# Patient Record
Sex: MALE | Race: WHITE
[De-identification: names, ages, dates, MRNs, and addresses within clinical notes are randomized per-mention and may not be internally consistent; named-entity substitution may affect disease eponyms.]

---
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
## 2019-02-15 NOTE — ASU PREOP CHECKLIST - VERIFY SURGICAL SITE/SIDE WITH PATIENT
Airway patent, Nasal mucosa clear. Mouth with normal mucosa. Throat has no vesicles, mild erythema with  oropharyngeal exudates and uvula is midline.
done

## 2020-06-02 ENCOUNTER — EMERGENCY (EMERGENCY)
Facility: HOSPITAL | Age: 20
LOS: 1 days | Discharge: HOME | End: 2020-06-02
Attending: EMERGENCY MEDICINE
Payer: COMMERCIAL

## 2020-06-02 VITALS
HEIGHT: 74 IN | OXYGEN SATURATION: 100 % | DIASTOLIC BLOOD PRESSURE: 76 MMHG | WEIGHT: 184.97 LBS | SYSTOLIC BLOOD PRESSURE: 137 MMHG | RESPIRATION RATE: 19 BRPM | HEART RATE: 68 BPM | TEMPERATURE: 98 F

## 2020-06-02 VITALS
OXYGEN SATURATION: 99 % | TEMPERATURE: 99 F | DIASTOLIC BLOOD PRESSURE: 81 MMHG | SYSTOLIC BLOOD PRESSURE: 133 MMHG | HEART RATE: 69 BPM | RESPIRATION RATE: 18 BRPM

## 2020-06-02 PROCEDURE — 12001 RPR S/N/AX/GEN/TRNK 2.5CM/<: CPT | Mod: F1

## 2020-06-02 PROCEDURE — 99283 EMERGENCY DEPT VISIT LOW MDM: CPT | Mod: 25

## 2020-06-02 PROCEDURE — 73130 X-RAY EXAM OF HAND: CPT | Mod: 26,LT

## 2020-06-02 PROCEDURE — 12001 RPR S/N/AX/GEN/TRNK 2.5CM/<: CPT

## 2020-06-02 PROCEDURE — 73130 X-RAY EXAM OF HAND: CPT

## 2020-06-02 RX ORDER — IBUPROFEN 200 MG
600 TABLET ORAL ONCE
Refills: 0 | Status: COMPLETED | OUTPATIENT
Start: 2020-06-02 | End: 2020-06-02

## 2020-06-02 RX ADMIN — Medication 600 MILLIGRAM(S): at 13:06

## 2020-06-02 NOTE — ED PROVIDER NOTE - PATIENT PORTAL LINK FT
You can access the FollowMyHealth Patient Portal offered by [facility] by registering at the following website: [email or web address]. By joining [employer]’s FollowMyHealth portal, you will also be able to view your health information using other applications (apps) compatible with our system.

## 2020-06-02 NOTE — ED PROVIDER NOTE - CARE PROVIDER_API CALL
Alea Garay  PLASTIC SURGERY  [address] [address]  Phone: [phone]  Fax: [phone]  Follow Up Time: Urgent

## 2020-06-02 NOTE — ED PROVIDER NOTE - OBJECTIVE STATEMENT
18 y/o M w/ no sig PMH presenting w/ L finger injury. [place] room 2. Shortly prior to arrival pt was using chainsaw during yard work. Put the chainsaw down and then inadvertently touch the saw w/ his L hand. Sustained laceration to L index finger. Wound irrigated and pressure wrapped by EMS. Pt complaining of pain to the site. Denies any numbness or tingling. Denies fevers, chills, headache, dizziness, blurred vision, chest pain, cough, shortness of breath, abdominal pain, n/v/d/c, urinary symptoms, rash.

## 2020-06-02 NOTE — ED PROVIDER NOTE - ATTENDING CONTRIBUTION TO CARE
------------ATTENDING NOTE------------  healthy vaccinated RHD pt c/o trauma to L 2nd (index finger) from chainsaw garden tool, no additional injuries, laceration adjacent to L 2nd PIP, has full extension/flexion, 2 pt nvi w/ bcr distally, no fb, plain radiographs w/o fx (my read), easily repaired, in depth dw pt about ddx, tx, antunez, continued close outpt fu.  - Rell Baron MD   ----------------------------------------------

## 2020-06-02 NOTE — ED ADULT NURSE NOTE - CHIEF COMPLAINT
The patient is a 19y Male complaining of The patient is a 19y Male complaining of left index finger laceration

## 2020-06-02 NOTE — ED PROVIDER NOTE - NSFOLLOWUPINSTRUCTIONS_ED_ALL_ED_FT
See Hand Specialist (Dr Bermeo) next week for follow up -- call to discuss.    Acetaminophen and/or Ibuprofen as directed for pain -- see medication warnings.    Keep wound clean/dry, wash gently soap/water, use finger splint as directed.    See FINGER LACERATION information and return instructions given to you.    Seek immediate medical care for new/worsening symptoms/concerns.

## 2020-06-02 NOTE — ED ADULT NURSE NOTE - CHPI ED NUR SYMPTOMS NEG
no blood in mucus/no purulent drainage/no redness/no vomiting/no chills/no drainage/no rectal pain/no fever

## 2020-06-02 NOTE — ED PROVIDER NOTE - PHYSICAL EXAMINATION
Gen: NAD, AOx3, able to make needs known, non-toxic  Head: NCAT  HEENT: EOMI, oral mucosa moist, normal conjunctiva  Lung: CTAB, no respiratory distress, no wheezes/rhonchi/rales B/L, speaking in full sentences  CV: RRR, no murmurs  Abd: soft, NTND, no guarding  MSK: L 2nd digit: approx 2 cm laceration distal to PIP w/o obvious foreign body, sensation intact, cap refill <2 seconds, flexion/extension intact. Remaining extremities unremarkable  Neuro: No focal sensory or motor deficits  Skin: Warm, well perfused  Psych: normal affect

## 2020-06-02 NOTE — ED ADULT NURSE NOTE - OBJECTIVE STATEMENT
pt was trimming the hedges and cut his left index deeply  there is bleeding but he feels the finger sera well   puslse in left wrist is without deficit  refill in left fingers are without deficit

## 2020-06-02 NOTE — ED PROVIDER NOTE - PROGRESS NOTE DETAILS
Dr. Jareth Zazueta, PGY-2: laceration repaired. Return precautions provided, pt verbalized understanding. To f/u w/ hand next week for suture removal. Ready for DC.

## 2022-01-12 ENCOUNTER — EMERGENCY (EMERGENCY)
Facility: HOSPITAL | Age: 22
LOS: 0 days | Discharge: HOME | End: 2022-01-12
Attending: EMERGENCY MEDICINE
Payer: COMMERCIAL

## 2022-01-12 VITALS
HEART RATE: 84 BPM | TEMPERATURE: 98 F | OXYGEN SATURATION: 100 % | SYSTOLIC BLOOD PRESSURE: 94 MMHG | RESPIRATION RATE: 14 BRPM | DIASTOLIC BLOOD PRESSURE: 64 MMHG

## 2022-01-12 VITALS — HEIGHT: 74 IN | WEIGHT: 176.37 LBS

## 2022-01-12 DIAGNOSIS — M25.571 PAIN IN RIGHT ANKLE AND JOINTS OF RIGHT FOOT: ICD-10-CM
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
## 2022-01-12 DIAGNOSIS — S93.401A SPRAIN OF UNSPECIFIED LIGAMENT OF RIGHT ANKLE, INITIAL ENCOUNTER: ICD-10-CM

## 2022-01-12 PROCEDURE — 73610 X-RAY EXAM OF ANKLE: CPT | Mod: RT

## 2022-01-12 PROCEDURE — 73610 X-RAY EXAM OF ANKLE: CPT | Mod: 26,RT

## 2022-01-12 PROCEDURE — 99283 EMERGENCY DEPT VISIT LOW MDM: CPT | Mod: 25

## 2022-01-12 PROCEDURE — 99283 EMERGENCY DEPT VISIT LOW MDM: CPT

## 2022-01-12 RX ORDER — IBUPROFEN 200 MG
600 TABLET ORAL ONCE
Refills: 0 | Status: COMPLETED | OUTPATIENT
Start: 2022-01-12 | End: 2022-01-12

## 2022-01-12 RX ADMIN — Medication 600 MILLIGRAM(S): at 22:30

## 2022-01-12 NOTE — ED PROVIDER NOTE - CONSTITUTIONAL, MLM
normal... Well appearing, awake, alert, oriented to person, place, time/situation, + mild discomfort d/t R ankle pain.

## 2022-01-12 NOTE — ED ADULT NURSE NOTE - OBJECTIVE STATEMENT
22 y/o male awake alert and oriented x4 presents to ED c/o right ankle/foot injury. Pt states he twirsted while playing basketball today. Denies fall, head injury or loc. Pt heard the "crack". AROM.

## 2022-01-12 NOTE — ED ADULT NURSE NOTE - NSIMPLEMENTINTERV_GEN_ALL_ED
Implemented All Fall Risk Interventions:  [place] to call system. Call bell, personal items and telephone within reach. Instruct patient to call for assistance. Room bathroom lighting operational. Non-slip footwear when patient is off stretcher. Physically safe environment: no spills, clutter or unnecessary equipment. Stretcher in lowest position, wheels locked, appropriate side rails in place. Provide visual cue, wrist band, yellow gown, etc. Monitor gait and stability. Monitor for mental status changes and reorient to person, place, and time. Review medications for side effects contributing to fall risk. Reinforce activity limits and safety measures with patient and family.

## 2022-01-12 NOTE — ED PROVIDER NOTE - MUSCULOSKELETAL, MLM
Neck: NT, AFROM w/o pain.  R ankle: + mild sts overlying lateral mall & surrounding soft tissues.  + TTP soft tissue area ant & inferior to lat mall > lat mall tenderness itself; no med mall tender; jt stable, + AFROM with pain.    R foot: no focal swelling/deformity, tenderness, DP pulse normal, toes move well, normal LT sensation, CR < 2secs.

## 2022-01-12 NOTE — ED PROVIDER NOTE - ENMT, MLM
NC/AT.  Airway patent, Nasal mucosa clear. Mouth with normal mucosa. Throat has no vesicles, no oropharyngeal exudates and uvula is midline.  No clinical evidence of facial injury.

## 2022-01-12 NOTE — ED PROVIDER NOTE - OBJECTIVE STATEMENT
22 yo M, denies PMH, ambulatory to ED with limping gait c/o'ing R ankle injury.   Incident occurred ~ 1 hour PTA: while playing basketball, + lost balance & twisted/inverted r ankle.  Pt recalls falling over but didn't fall upon floor.  No head injury, LOC.  Pt felt immediate "cracking sensation to lateral R ankle with + gradual onset/worsening of aching pain.  Pain exacerbated by weight-bearing, AROM & becomes sharp when flares up.  Slight tingling to R 1st toe.  pain radiates up R ant koroma somewhat.  No pain to r foot. 20 yo M, denies PMH, ambulatory to ED with limping gait c/o'ing R ankle injury.   Incident occurred ~ 1 hour PTA: while playing basketball, + lost balance & twisted/inverted R ankle.  Pt recalls falling over but didn't fall upon floor.  No head injury, LOC.  Pt felt immediate "cracking sensation to lateral R ankle with + gradual onset/worsening of aching pain.  Pain exacerbated by weight-bearing, AROM & becomes sharp when flares up.  Slight tingling to R 1st toe.  pain radiates up R ant koroma somewhat.  No pain to r foot.

## 2022-01-12 NOTE — ED ADULT TRIAGE NOTE - CHIEF COMPLAINT QUOTE
Pt present to ED for right pain that began 45 minutes prior to arrival Pt states that he twisted ankle out leading to pain in affected extremity. Denies fall, LOC or blood thinners. No obvious signs of trauma or deformity.

## 2022-01-12 NOTE — ED PROVIDER NOTE - NSFOLLOWUPINSTRUCTIONS_ED_ALL_ED_FT
Motrin/Advil 200 mg 2 - 3 tabs every 6 - 8 hours with some food as needed for pain.  Use cane for assisted ambulation.  Follow up with orthopedist as listed below.  Avoid running, jumping until instructed to do so by orthopedist.        Ankle Sprain    WHAT YOU NEED TO KNOW:    An ankle sprain happens when 1 or more ligaments in your ankle joint stretch or tear. Ligaments are tough tissues that connect bones. Ligaments support your joints and keep your bones in place.    DISCHARGE INSTRUCTIONS:    Return to the emergency department if:   •You have severe pain in your ankle.      •Your foot or toes are cold or numb.      •Your ankle becomes more weak or unstable (wobbly).      •You are unable to put any weight on your ankle or foot.      •Your swelling has increased or returned.      Call your doctor if:   •Your pain does not go away, even after treatment.      •You have questions or concerns about your condition or care.      Medicines: You may need any of the following:   •NSAIDs, such as ibuprofen, help decrease swelling, pain, and fever. This medicine is available with or without a doctor's order. NSAIDs can cause stomach bleeding or kidney problems in certain people. If you take blood thinner medicine, always ask your healthcare provider if NSAIDs are safe for you. Always read the medicine label and follow directions.      •Acetaminophen decreases pain and fever. It is available without a doctor's order. Ask how much to take and how often to take it. Follow directions. Read the labels of all other medicines you are using to see if they also contain acetaminophen, or ask your doctor or pharmacist. Acetaminophen can cause liver damage if not taken correctly. Do not use more than 4 grams (4,000 milligrams) total of acetaminophen in one day.       •Prescription pain medicine may be given. Ask your healthcare provider how to take this medicine safely. Some prescription pain medicines contain acetaminophen. Do not take other medicines that contain acetaminophen without talking to your healthcare provider. Too much acetaminophen may cause liver damage. Prescription pain medicine may cause constipation. Ask your healthcare provider how to prevent or treat constipation.       •Take your medicine as directed. Contact your healthcare provider if you think your medicine is not helping or if you have side effects. Tell him or her if you are allergic to any medicine. Keep a list of the medicines, vitamins, and herbs you take. Include the amounts, and when and why you take them. Bring the list or the pill bottles to follow-up visits. Carry your medicine list with you in case of an emergency.      Self-care:   •Use support devices, such as a brace, cast, or splint, to limit your movement and protect your joint. You may need to use crutches to decrease your pain as you move around.      •Go to physical therapy as directed. A physical therapist teaches you exercises to help improve movement and strength, and to decrease pain.      •Rest your ankle so that it can heal. Return to normal activities as directed.      •Apply ice on your ankle for 15 to 20 minutes every hour or as directed. Use an ice pack, or put crushed ice in a plastic bag. Cover it with a towel. Ice helps prevent tissue damage and decreases swelling and pain.      •Compress your ankle. Ask if you should wrap an elastic bandage around your injured ligament. An elastic bandage provides support and helps decrease swelling and movement so your joint can heal. Wear as long as directed.  How to Wrap an Elastic Bandage           •Elevate your ankle above the level of your heart as often as you can. This will help decrease swelling and pain. Prop your ankle on pillows or blankets to keep it elevated comfortably.   Elevate Leg           Prevent another ankle sprain:   •Let your ankle heal. Find out how long your ligament needs to heal. Do not do any physical activity until your healthcare provider says it is okay. If you start activity too soon, you may develop a more serious injury.      •Always warm up and stretch before you exercise or play sports.      •Use the right equipment. Always wear shoes that fit well and are made for the activity that you are doing. You may also need ankle supports, elbow and knee pads, or braces.      Follow up with your doctor as directed: Write down your questions so you remember to ask them during your visits.            Ankle Stirrup Splint    WHAT YOU NEED TO KNOW:    An ankle stirrup splint is used after an injury to increase comfort and limit movement. The splint squeezes your ankle between 2 plastic pads. The pads are filled with air to cushion and support your ankle while it heals.     Ankle Stirrup Splint          DISCHARGE INSTRUCTIONS:    Rest: Rest your ankle for 3 days so it can heal. You may need crutches to take weight off your injured ankle when you walk. Use crutches as directed.    Ice: Ice helps decrease pain and swelling. Put crushed ice in a plastic bag and cover it with a towel. Put the ice on your ankle for 15 to 20 minutes every hour. Do this for 3 days.    Support: The tight hold of the stirrup splint helps support your ankle as it heals. Some ankle sprains may be treated with an elastic wrap and the stirrup splint to reduce swelling. Wear your stirrup splint as directed.    Elevate: Raise your ankle above your hip for 15 minutes every 3 to 4 hours. This will help decrease pain and swelling. Lie down on the couch and place your ankle on pillows to elevate your ankle comfortably.    Medicines:   •Pain medicine: You may be given medicine to take away or decrease pain. Do not wait until the pain is severe before you take your medicine.      •NSAIDs help decrease swelling and pain or fever. This medicine is available with or without a doctor's order. NSAIDs can cause stomach bleeding or kidney problems in certain people. If you take blood thinner medicine, always ask your healthcare provider if NSAIDs are safe for you. Always read the medicine label and follow directions.      •Take your medicine as directed. Contact your healthcare provider if you think your medicine is not helping or if you have side effects. Tell him of her if you are allergic to any medicine. Keep a list of the medicines, vitamins, and herbs you take. Include the amounts, and when and why you take them. Bring the list or the pill bottles to follow-up visits. Carry your medicine list with you in case of an emergency.      Exercise your ankle: Begin gentle exercise as directed. The exercises can help restore strength and increase the motion in your foot. Check with your healthcare provider before you return to normal activities or sports.    Follow up with your doctor as directed: Write down your questions so you remember to ask them during your visits.     Contact your healthcare provider if:   •Your ankle is weak, numb, painful, or swollen.      •Your foot is cold.      •Your ankle is stiff when you move it.      •You injure your ankle after treatment.      •You have questions or concerns about your injury or treatment.            How to Choose and Use a Cane    AMBULATORY CARE:    What kind of cane to choose: Your healthcare provider will help you choose the right cane for your needs. Canes are made from wood, plastic, or metal. A cane can be adjusted to fit your height. The bottom of the cane usually has a nonskid rubber tip to prevent it from slipping. The following are common kinds of canes:  •Standard canes have a rounded crook handle. Use a cane with a wooden or plastic handle instead of metal. A metal handle may slip from your hand. In cold weather, the metal handle may get too cold for you to touch.      •Straight-handle canes, or T-handle canes, are used if your hand is weak.      •Broad-based canes are lightweight canes with 3 or 4 short legs. These legs give you the most support. You may need this type of cane if it is hard for you to keep your balance.  Cane Types           How to use the cane:   •To walk on flat floors: ?Put the cane about 4 inches (10 cm) to the side of your stronger leg.      ?Put weight on your stronger side.      ?Move the cane about 4 inches (10 cm) in front of your stronger leg and bring your weaker leg forward at the same time.      ?Use the cane to help keep your weight off your weaker leg and move your stronger leg ahead.      ?Place your heel a little beyond the tip of the cane.      •To use on stairs: Ask your healthcare provider to show you how to safely use your cane on stairs.Do not use your cane on the stairs unless someone is with you.      •To get into a chair: ?Stand with the back of your legs against the chair seat.      ?Rest the cane against the chair.      ?Reach back with both hands to  the chair arms.      ?Lift your weaker leg slightly off the floor.      ?Put all your weight on your stronger leg.      ?Slowly sit down and slide back into the chair.      •To get out of a chair: ?Hold your cane with your stronger hand.      ?Grasp the arms of the chair.      ?Put your stronger foot a little forward.      ?Lean slightly forward and push on the arms of the chair to raise yourself.      ?Stand with your cane about 4 inches (10 cm) to the side of your stronger foot.        Cane safety tips:   •Wear shoes with rubber soles, such as tennis shoes. Slippers should not be worn because they can slide off your feet and cause a fall. Do not wear shoes with leather heels or soles that may slide.      •Check the floor to be sure it is safe. The floor must be clean, dry, and well lit. Remove throw rugs to prevent falls. Tape or nail down loose carpet edges. Keep the traffic areas and the floor free of clutter.  Fall Prevention for Adults           •Stand for a few seconds before you start to walk with your cane. This will help prevent dizziness.      •Look straight ahead when you walk. You may run into or trip over something if you look at your feet.      •Use a backpack or a bag with a long strap that you can wear across your body. This will keep your hands free. Try not to carry heavy things.

## 2022-01-12 NOTE — ED PROVIDER NOTE - NEURO NEGATIVE STATEMENT, MLM
slight tingling R 1st toe.  no loss of consciousness, no gait abnormality, no headache, and no weakness.

## 2022-01-12 NOTE — ED PROVIDER NOTE - SKIN, MLM
Skin normal color for race, warm, dry and intact. No evidence of rash. Skin normal color for race, warm, dry and intact. No evidence of rash.  No discoloration, incl. R ankle/foot.

## 2022-01-12 NOTE — ED PROVIDER NOTE - PATIENT PORTAL LINK FT
You can access the FollowMyHealth Patient Portal offered by [facility] by registering at the following website: [email or web address]. By joining [employer]’s FollowMyHealth portal, you will also be able to view your health information using other applications (apps) compatible with our system.

## 2022-01-12 NOTE — ED PROVIDER NOTE - CLINICAL SUMMARY MEDICAL DECISION MAKING FREE TEXT BOX
R ankle injury incurred during basketball, lateral aspect.  Plan: XRs, po Motrin.  Re-evaluate. R ankle injury incurred during basketball, lateral aspect.  Plan: XRs negative, po Motrin.  R ankle Aircast applied with instructions given for use & outpt Ortho f/u.  Pt stable for D/C home.

## 2022-01-12 NOTE — ED PROVIDER NOTE - CARE PROVIDER_API CALL
Katie Neri)  [facility]  [address] [place] [address]  Phone: [phone]  Fax: [phone]  Follow Up Time:

## 2022-06-06 ENCOUNTER — EMERGENCY (EMERGENCY)
Facility: HOSPITAL | Age: 22
LOS: 0 days | Discharge: HOME | End: 2022-06-06
Attending: EMERGENCY MEDICINE
Payer: COMMERCIAL

## 2022-06-06 VITALS
RESPIRATION RATE: 18 BRPM | SYSTOLIC BLOOD PRESSURE: 114 MMHG | DIASTOLIC BLOOD PRESSURE: 60 MMHG | OXYGEN SATURATION: 98 % | HEIGHT: 74 IN | HEART RATE: 63 BPM | WEIGHT: 175.05 LBS | TEMPERATURE: 98 F

## 2022-06-06 DIAGNOSIS — R10.31 RIGHT LOWER QUADRANT PAIN: ICD-10-CM

## 2022-06-06 DIAGNOSIS — R59.1 GENERALIZED ENLARGED LYMPH NODES: ICD-10-CM

## 2022-06-06 PROCEDURE — 99282 EMERGENCY DEPT VISIT SF MDM: CPT

## 2022-06-06 PROCEDURE — 99284 EMERGENCY DEPT VISIT MOD MDM: CPT

## 2022-06-06 NOTE — ED STATDOCS - PATIENT PORTAL LINK FT
You can access the FollowMyHealth Patient Portal offered by [facility] by registering at the following website: [email or web address]. By joining [employer]’s FollowMyHealth portal, you will also be able to view your health information using other applications (apps) compatible with our system.

## 2022-06-06 NOTE — ED STATDOCS - OBJECTIVE STATEMENT
22 y/o male with no significant PMHx presents to the ED c/o right groin pain. Pt states that he was having a bowel movement today and pushed. Pt concerned for hernia. Denies fevers, night sweats, dysuria. No other complaints at this time.

## 2022-06-06 NOTE — ED ADULT TRIAGE NOTE - GLASGOW COMA SCALE: SCORE, MLM
2021        Re: Annamarie K Ullrich    : 1988        To Whom It Concern:     This is to certify that the above named patient had an appointment at this office for professional attention on  21. Please excuse Asha from work for her appointment.      Thank you,                Office of Tammy L Schladweiler, NP  [address] [facility] [address]     15

## 2022-06-06 NOTE — ED STATDOCS - GENITOURINARY, MLM
normal... left groin no lymphadenopathy. right groin with 2 firm, tender subcentimeter lymph nodes. no evidence of hernia.

## 2022-06-06 NOTE — ED STATDOCS - CLINICAL SUMMARY MEDICAL DECISION MAKING FREE TEXT BOX
Pt with lymphadenopathy. Recommended to follow-up with PMD if not resolved in one week. Motrin for pain. Pt with localized right inguinal lymphadenopathy. Recommended to follow-up with PMD if not resolved in one week. Motrin for pain.

## 2022-06-06 NOTE — ED STATDOCS - NSFOLLOWUPINSTRUCTIONS_ED_ALL_ED_FT
motrin 600mg every 6 hours for pain with food as needed    Lymphadenopathy       Lymphadenopathy means that your lymph glands are swollen or larger than normal. Lymph glands, also called lymph nodes, are collections of tissue that filter excess fluid, bacteria, viruses, and waste from your bloodstream. They are part of your body's disease-fighting system (immune system), which protects your body from germs.    There may be different causes of lymphadenopathy, depending on where it is in your body. Some types go away on their own. Lymphadenopathy can occur anywhere that you have lymph glands, including these areas:  •Neck (cervical lymphadenopathy).      •Chest (mediastinal lymphadenopathy).      •Lungs (hilar lymphadenopathy).      •Underarms (axillary lymphadenopathy).      •Groin (inguinal lymphadenopathy).      When your immune system responds to germs, infection-fighting cells and fluid build up in your lymph glands. This causes some swelling and enlargement. If the lymph nodes do not go back to normal size after you have an infection or disease, your health care provider may do tests. These tests help to monitor your condition and find the reason why the glands are still swollen and enlarged.      Follow these instructions at home:     •Get plenty of rest.      •Your health care provider may recommend over-the-counter medicines for pain. Take over-the-counter and prescription medicines only as told by your health care provider.    •If directed, apply heat to swollen lymph glands as often as told by your health care provider. Use the heat source that your health care provider recommends, such as a moist heat pack or a heating pad.  •Place a towel between your skin and the heat source.       •Leave the heat on for 20–30 minutes.       •Remove the heat if your skin turns bright red. This is especially important if you are unable to feel pain, heat, or cold. You may have a greater risk of getting burned.      •Check your affected lymph glands every day for changes. Check other lymph gland areas as told by your health care provider. Check for changes such as:  •More swelling.      •Sudden increase in size.      •Redness or pain.      •Hardness.        •Keep all follow-up visits. This is important.        Contact a health care provider if you have:  •Lymph glands that:  •Are still swollen after 2 weeks.      •Have suddenly gotten bigger or the swelling spreads.      •Are red, painful, or hard.        •Fluid leaking from the skin near an enlarged lymph gland.      •Problems with breathing.      •A fever, chills, or night sweats.      •Fatigue.      •A sore throat.      •Pain in your abdomen.      •Weight loss.        Get help right away if you have:    •Severe pain.      •Chest pain.      •Shortness of breath.      These symptoms may represent a serious problem that is an emergency. Do not wait to see if the symptoms will go away. Get medical help right away. Call your local emergency services (911 in the [place] Do not drive yourself to the hospital.       Summary    •Lymphadenopathy means that your lymph glands are swollen or larger than normal.      •Lymph glands, also called lymph nodes, are collections of tissue that filter excess fluid, bacteria, viruses, and waste from the bloodstream. They are part of your body's disease-fighting system (immune system).      •Lymphadenopathy can occur anywhere that you have lymph glands.      •If the lymph nodes do not go back to normal size after you have an infection or disease, your health care provider may do tests to monitor your condition and find the reason why the glands are still swollen and enlarged.      •Check your affected lymph glands every day for changes. Check other lymph gland areas as told by your health care provider.      This information is not intended to replace advice given to you by your health care provider. Make sure you discuss any questions you have with your health care provider.      Document Revised: 10/13/2021 Document Reviewed: 10/13/2021    [employer] Patient Education © 2022 [employer]

## 2023-07-11 ENCOUNTER — EMERGENCY (EMERGENCY)
Facility: HOSPITAL | Age: 23
LOS: 0 days | Discharge: HOME | End: 2023-07-11
Attending: EMERGENCY MEDICINE
Payer: COMMERCIAL

## 2023-07-11 VITALS
HEIGHT: 75 IN | SYSTOLIC BLOOD PRESSURE: 108 MMHG | WEIGHT: 175.05 LBS | HEART RATE: 77 BPM | RESPIRATION RATE: 18 BRPM | DIASTOLIC BLOOD PRESSURE: 61 MMHG | OXYGEN SATURATION: 100 % | TEMPERATURE: 97 F

## 2023-07-11 DIAGNOSIS — R07.89 OTHER CHEST PAIN: ICD-10-CM

## 2023-07-11 DIAGNOSIS — M41.9 SCOLIOSIS, UNSPECIFIED: ICD-10-CM
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
## 2023-07-11 LAB
ANION GAP SERPL CALC-SCNC: 3 MMOL/L — LOW (ref 5–17)
BASOPHILS # BLD AUTO: 0.03 K/UL — SIGNIFICANT CHANGE UP (ref 0–0.2)
BASOPHILS NFR BLD AUTO: 0.5 % — SIGNIFICANT CHANGE UP (ref 0–2)
BUN SERPL-MCNC: 24 MG/DL — HIGH (ref 7–23)
CALCIUM SERPL-MCNC: 8.9 MG/DL — SIGNIFICANT CHANGE UP (ref 8.5–10.1)
CHLORIDE SERPL-SCNC: 110 MMOL/L — HIGH (ref 96–108)
CK SERPL-CCNC: 105 U/L — SIGNIFICANT CHANGE UP (ref 26–308)
CO2 SERPL-SCNC: 29 MMOL/L — SIGNIFICANT CHANGE UP (ref 22–31)
CREAT SERPL-MCNC: 1.08 MG/DL — SIGNIFICANT CHANGE UP (ref 0.5–1.3)
EGFR: 99 ML/MIN/1.73M2 — SIGNIFICANT CHANGE UP
EOSINOPHIL # BLD AUTO: 0.04 K/UL — SIGNIFICANT CHANGE UP (ref 0–0.5)
EOSINOPHIL NFR BLD AUTO: 0.7 % — SIGNIFICANT CHANGE UP (ref 0–6)
GLUCOSE SERPL-MCNC: 108 MG/DL — HIGH (ref 70–99)
HCT VFR BLD CALC: 41.8 % — SIGNIFICANT CHANGE UP (ref 39–50)
HGB BLD-MCNC: 14.8 G/DL — SIGNIFICANT CHANGE UP (ref 13–17)
IMM GRANULOCYTES NFR BLD AUTO: 0.2 % — SIGNIFICANT CHANGE UP (ref 0–0.9)
LYMPHOCYTES # BLD AUTO: 2.11 K/UL — SIGNIFICANT CHANGE UP (ref 1–3.3)
LYMPHOCYTES # BLD AUTO: 35.5 % — SIGNIFICANT CHANGE UP (ref 13–44)
MCHC RBC-ENTMCNC: 30.4 PG — SIGNIFICANT CHANGE UP (ref 27–34)
MCHC RBC-ENTMCNC: 35.4 GM/DL — SIGNIFICANT CHANGE UP (ref 32–36)
MCV RBC AUTO: 85.8 FL — SIGNIFICANT CHANGE UP (ref 80–100)
MONOCYTES # BLD AUTO: 0.48 K/UL — SIGNIFICANT CHANGE UP (ref 0–0.9)
MONOCYTES NFR BLD AUTO: 8.1 % — SIGNIFICANT CHANGE UP (ref 2–14)
NEUTROPHILS # BLD AUTO: 3.27 K/UL — SIGNIFICANT CHANGE UP (ref 1.8–7.4)
NEUTROPHILS NFR BLD AUTO: 55 % — SIGNIFICANT CHANGE UP (ref 43–77)
PLATELET # BLD AUTO: 188 K/UL — SIGNIFICANT CHANGE UP (ref 150–400)
POTASSIUM SERPL-MCNC: 3.8 MMOL/L — SIGNIFICANT CHANGE UP (ref 3.5–5.3)
POTASSIUM SERPL-SCNC: 3.8 MMOL/L — SIGNIFICANT CHANGE UP (ref 3.5–5.3)
RBC # BLD: 4.87 M/UL — SIGNIFICANT CHANGE UP (ref 4.2–5.8)
RBC # FLD: 12.4 % — SIGNIFICANT CHANGE UP (ref 10.3–14.5)
SODIUM SERPL-SCNC: 142 MMOL/L — SIGNIFICANT CHANGE UP (ref 135–145)
TROPONIN I, HIGH SENSITIVITY RESULT: 8.18 NG/L — SIGNIFICANT CHANGE UP
WBC # BLD: 5.94 K/UL — SIGNIFICANT CHANGE UP (ref 3.8–10.5)
WBC # FLD AUTO: 5.94 K/UL — SIGNIFICANT CHANGE UP (ref 3.8–10.5)

## 2023-07-11 PROCEDURE — 80048 BASIC METABOLIC PNL TOTAL CA: CPT

## 2023-07-11 PROCEDURE — 85025 COMPLETE CBC W/AUTO DIFF WBC: CPT

## 2023-07-11 PROCEDURE — 71046 X-RAY EXAM CHEST 2 VIEWS: CPT

## 2023-07-11 PROCEDURE — 82550 ASSAY OF CK (CPK): CPT

## 2023-07-11 PROCEDURE — 36415 COLL VENOUS BLD VENIPUNCTURE: CPT

## 2023-07-11 PROCEDURE — 93010 ELECTROCARDIOGRAM REPORT: CPT

## 2023-07-11 PROCEDURE — 93005 ELECTROCARDIOGRAM TRACING: CPT

## 2023-07-11 PROCEDURE — 84484 ASSAY OF TROPONIN QUANT: CPT

## 2023-07-11 PROCEDURE — 99285 EMERGENCY DEPT VISIT HI MDM: CPT

## 2023-07-11 PROCEDURE — 71046 X-RAY EXAM CHEST 2 VIEWS: CPT | Mod: 26

## 2023-07-11 PROCEDURE — 99285 EMERGENCY DEPT VISIT HI MDM: CPT | Mod: 25

## 2023-07-11 RX ORDER — IBUPROFEN 200 MG
400 TABLET ORAL ONCE
Refills: 0 | Status: COMPLETED | OUTPATIENT
Start: 2023-07-11 | End: 2023-07-11

## 2023-07-11 RX ADMIN — Medication 400 MILLIGRAM(S): at 21:31

## 2023-07-11 NOTE — ED STATDOCS - OBJECTIVE STATEMENT
21 y/o male with PMHx of scoliosis presents to the ED c/o midsternal sharp chest pain and tightness while [place] the beach today. Pt lied down after onset however pain worsened, worse with movement. Denies radiation of pain or weakness/numbness to the back, neck, or arms. Pt reports a few months ago had similar symptoms, had EKG and echocardiogram performed at [facility] which were negative. Notes he lifted a mattress 2 days ago and believes pain may be related to that. Took Xanax and Tylenol PTA. NKDA. Daily marijuana smoker.

## 2023-07-11 NOTE — ED STATDOCS - PHYSICAL EXAMINATION
Gen: awake, alert, WD, WN, NAD  Head:  NC, AT  ENT:  PERRL, moist mucous membranes, OP-clear  Neck: supple, nontender  CV:  S1 S2, RRR, no M/G/R  Pulm:  CTAB, good air movement  Abd:  Soft, nontender, nondistended, +BS, no rebound/guarding  Back:  no CVAT  Ext:  Chest pain reproduced when pt flexed bilateral shoulders, warm, well perfused, moving all extremities spontaneously, no peripheral edema, distal pulses intact  Skin: intact  Neuro:  A&Ox3, no focal neuro deficit, speech clear

## 2023-07-11 NOTE — ED ADULT NURSE NOTE - OBJECTIVE STATEMENT
22y male presented to the ED with complaints of chest pain for the last few days. Pt said he was moving and lifting heavy things when the pain began. Pt reports midsternal chest pain with movement. no vision changes, SOB N/V/D.

## 2023-07-11 NOTE — ED ADULT NURSE NOTE - NSFALLUNIVINTERV_ED_ALL_ED
Bed/Stretcher in lowest position, wheels locked, appropriate side rails in place/Call bell, personal items and telephone in reach/Instruct patient to call for assistance before getting out of bed/chair/stretcher/Non-slip footwear applied when patient is off stretcher/[place] to call system/Physically safe environment - no spills, clutter or unnecessary equipment/Purposeful proactive rounding/Room/bathroom lighting operational, light cord in reach

## 2023-07-11 NOTE — ED ADULT TRIAGE NOTE - CHIEF COMPLAINT QUOTE
Pt. presents to ED c/o chest pain/tightness. Pt. reports lifting something heavy yesterday, then today began to feel like his chest was tight while at the beach. Pt. reports going home then feeling like he couldn't get up. Pt. currently ambulatory in triage, taken for immediate EKG Pt. presents to ED c/o chest pain/tightness. Pt. reports lifting something heavy yesterday, then today began to feel like his chest was tight while at the beach. Pt. reports going home then feeling like he couldn't get up. Pt. denies SOB however reports he feels some pain while trying to take a deep breath in. Pt. currently ambulatory in triage, taken for immediate EKG

## 2023-07-11 NOTE — ED ADULT NURSE NOTE - CHIEF COMPLAINT QUOTE
Pt. presents to ED c/o chest pain/tightness. Pt. reports lifting something heavy yesterday, then today began to feel like his chest was tight while at the beach. Pt. reports going home then feeling like he couldn't get up. Pt. denies SOB however reports he feels some pain while trying to take a deep breath in. Pt. currently ambulatory in triage, taken for immediate EKG

## 2023-07-11 NOTE — ED STATDOCS - ATTENDING APP SHARED VISIT CONTRIBUTION OF CARE
I, Santiago Lee, performed the initial face to face bedside interview with this patient regarding history of present illness, review of symptoms and relevant past medical, social and family history.  I completed an independent physical examination.  I was the initial provider who evaluated this patient. I have signed out the follow up of any pending tests (i.e. labs, radiological studies) to the ACP.  I have communicated the patient’s plan of care and disposition with the ACP.  The history, relevant review of systems, past medical and surgical history, medical decision making, and physical examination was documented by the scribe in my presence and I attest to the accuracy of the documentation.

## 2023-07-11 NOTE — ED STATDOCS - PROGRESS NOTE
Per DR Ortiz vein mapping done 9/28/2020 will be using patients own vein for bypass no graft is needed cd 9/30/2020   Stable.

## 2023-07-11 NOTE — ED STATDOCS - PATIENT PORTAL LINK FT
You can access the FollowMyHealth Patient Portal offered by [facility] by registering at the following website: [email or web address]. By joining [employer]s FollowMyHealth portal, you will also be able to view your health information using other applications (apps) compatible with our system.

## 2023-07-11 NOTE — ED STATDOCS - PROGRESS NOTE DETAILS
pt here with chest pain, offers no complaints currently. pt awaiting labs and imaging and will reeval. -Mary Jane Gerber PA-C

## 2025-01-21 NOTE — ED ADULT NURSE NOTE - HIV OFFER
Addended by: GAURI CONTE on: 1/21/2025 05:13 PM     Modules accepted: Orders    
Unable to answer due to medical condition/unresponsive/etc...

## 2025-03-20 NOTE — ED ADULT NURSE NOTE - NS ED NURSE LEVEL OF CONSCIOUSNESS MENTAL STATUS
Last appointment:  3/18/2025    Next appointment:  6/10/2025      
Patient states when prescriptions were filled a [facility] out pharmacy it cancelled refills at [facility].   
Awake/Alert